# Patient Record
Sex: FEMALE | Race: WHITE | NOT HISPANIC OR LATINO | URBAN - METROPOLITAN AREA
[De-identification: names, ages, dates, MRNs, and addresses within clinical notes are randomized per-mention and may not be internally consistent; named-entity substitution may affect disease eponyms.]

---

## 2024-07-30 ASSESSMENT — ENCOUNTER SYMPTOMS
SHORTNESS OF BREATH: 0
ACTIVITY CHANGE: 0
ABDOMINAL PAIN: 0
UNEXPECTED WEIGHT CHANGE: 0
DIFFICULTY URINATING: 0
DYSURIA: 0
CHEST TIGHTNESS: 0
JOINT SWELLING: 0
ADENOPATHY: 0
DIZZINESS: 0
HEADACHES: 0
COLOR CHANGE: 0
FATIGUE: 0
ABDOMINAL DISTENTION: 0
WEAKNESS: 0

## 2024-07-31 ENCOUNTER — LAB (OUTPATIENT)
Dept: LAB | Facility: LAB | Age: 54
End: 2024-07-31
Payer: COMMERCIAL

## 2024-07-31 ENCOUNTER — OFFICE VISIT (OUTPATIENT)
Dept: OBSTETRICS AND GYNECOLOGY | Facility: CLINIC | Age: 54
End: 2024-07-31
Payer: COMMERCIAL

## 2024-07-31 VITALS
WEIGHT: 238.2 LBS | SYSTOLIC BLOOD PRESSURE: 128 MMHG | HEIGHT: 68 IN | BODY MASS INDEX: 36.1 KG/M2 | DIASTOLIC BLOOD PRESSURE: 74 MMHG

## 2024-07-31 DIAGNOSIS — Z12.11 SCREEN FOR COLON CANCER: ICD-10-CM

## 2024-07-31 DIAGNOSIS — Z01.419 VISIT FOR GYNECOLOGIC EXAMINATION: Primary | ICD-10-CM

## 2024-07-31 DIAGNOSIS — N95.1 PERIMENOPAUSE: ICD-10-CM

## 2024-07-31 DIAGNOSIS — Z30.431 INTRAUTERINE DEVICE SURVEILLANCE: ICD-10-CM

## 2024-07-31 DIAGNOSIS — Z11.51 SCREENING FOR HUMAN PAPILLOMAVIRUS: ICD-10-CM

## 2024-07-31 PROCEDURE — 1036F TOBACCO NON-USER: CPT | Performed by: OBSTETRICS & GYNECOLOGY

## 2024-07-31 PROCEDURE — 3008F BODY MASS INDEX DOCD: CPT | Performed by: OBSTETRICS & GYNECOLOGY

## 2024-07-31 PROCEDURE — 99396 PREV VISIT EST AGE 40-64: CPT | Performed by: OBSTETRICS & GYNECOLOGY

## 2024-07-31 PROCEDURE — 83001 ASSAY OF GONADOTROPIN (FSH): CPT

## 2024-07-31 PROCEDURE — 36415 COLL VENOUS BLD VENIPUNCTURE: CPT

## 2024-07-31 PROCEDURE — 87624 HPV HI-RISK TYP POOLED RSLT: CPT | Performed by: OBSTETRICS & GYNECOLOGY

## 2024-07-31 PROCEDURE — 88175 CYTOPATH C/V AUTO FLUID REDO: CPT | Mod: TC,GCY,PORLAB,WESLAB | Performed by: OBSTETRICS & GYNECOLOGY

## 2024-07-31 RX ORDER — LISINOPRIL AND HYDROCHLOROTHIAZIDE 20; 25 MG/1; MG/1
1 TABLET ORAL
COMMUNITY
Start: 2023-12-26

## 2024-07-31 RX ORDER — VENLAFAXINE HYDROCHLORIDE 75 MG/1
75 CAPSULE, EXTENDED RELEASE ORAL
COMMUNITY
Start: 2023-11-02 | End: 2024-11-01

## 2024-07-31 RX ORDER — ESOMEPRAZOLE MAGNESIUM 40 MG/1
1 CAPSULE, DELAYED RELEASE ORAL EVERY 24 HOURS
COMMUNITY

## 2024-07-31 RX ORDER — POTASSIUM CHLORIDE 750 MG/1
10 CAPSULE, EXTENDED RELEASE ORAL 2 TIMES DAILY
COMMUNITY

## 2024-07-31 RX ORDER — LIDOCAINE HYDROCHLORIDE AND HYDROCORTISONE ACETATE 30; 5 MG/G; MG/G
CREAM TOPICAL
COMMUNITY
Start: 2024-05-06

## 2024-07-31 ASSESSMENT — PATIENT HEALTH QUESTIONNAIRE - PHQ9
SUM OF ALL RESPONSES TO PHQ9 QUESTIONS 1 & 2: 0
1. LITTLE INTEREST OR PLEASURE IN DOING THINGS: NOT AT ALL
2. FEELING DOWN, DEPRESSED OR HOPELESS: NOT AT ALL

## 2024-07-31 ASSESSMENT — LIFESTYLE VARIABLES
HOW OFTEN DO YOU HAVE A DRINK CONTAINING ALCOHOL: NEVER
AUDIT-C TOTAL SCORE: 0
HOW OFTEN DO YOU HAVE SIX OR MORE DRINKS ON ONE OCCASION: NEVER
SKIP TO QUESTIONS 9-10: 1
HOW MANY STANDARD DRINKS CONTAINING ALCOHOL DO YOU HAVE ON A TYPICAL DAY: PATIENT DOES NOT DRINK

## 2024-07-31 ASSESSMENT — ENCOUNTER SYMPTOMS
LOSS OF SENSATION IN FEET: 0
OCCASIONAL FEELINGS OF UNSTEADINESS: 0
DEPRESSION: 0

## 2024-07-31 ASSESSMENT — PAIN SCALES - GENERAL: PAINLEVEL: 0-NO PAIN

## 2024-07-31 NOTE — PROGRESS NOTES
"Annual-menopause  Subjective   Laura Skaggs is a 53 y.o. ,last gyn exam 7/13/2022,  who is here for a routine exam/iud surveillance.   Complaints:  denies vag bleed or discharge; denies pelvic  pain, pressure, or persistent bloating.  Current Mirena placed 04/2019-- using tx mmr/bcm  PMHx: prev  BMI 41, now 36 ( low fat diet to help fatty liver dz).       Arthrits hips/right knee.       Hypertension.  Acid reflux. Depression/anxiety.  Surg Hx: 2024 gb and bladder cancer resection  Father: alive,  Htn; Mother: alive,  Mental Illness  Occupation: teacher.  Last pap 06/08/2020-both pap/hpv neg; Mamm 05/30/2024 [facility] : NEG.   Pelvic US: 3/2019 9cm uterus w 2cm serosal fibroid/fundus; thin endo; No intracavitary lesions.   BCM= mirena replaced 04/15/2019.   Periods : now absent on Mirena; Menarche 11; Last Period 06/22/2021.   STDs None.  not currently sexually active, updated 2024  Total preg  2.  Miscarriage(s)  1. NVD  1.   Review of Systems   Constitutional:  Negative for activity change, fatigue and unexpected weight change.   Respiratory:  Negative for chest tightness and shortness of breath.    Cardiovascular:  Negative for chest pain and leg swelling.   Gastrointestinal:  Negative for abdominal distention and abdominal pain.   Genitourinary:  Negative for difficulty urinating, dysuria, genital sores, pelvic pain, vaginal bleeding, vaginal discharge and vaginal pain.   Musculoskeletal:  Negative for gait problem and joint swelling.   Skin:  Negative for color change and rash.   Neurological:  Negative for dizziness, weakness and headaches.   Hematological:  Negative for adenopathy.   Objective Visit Vitals  /74   Ht 1.727 m (5' 8\")   Wt 108 kg (238 lb 3.2 oz)   BMI 36.22 kg/m²   OB Status Postmenopausal   Smoking Status Never   BSA 2.28 m²       General:   Alert and oriented, in no acute distress   Neck: Supple. No visible thyromegaly. adiposity   Breast/Axilla: Normal to palpation bilaterally without masses, " skin changes, or nipple discharge.    Abdomen: Soft, non-tender, without masses or organomegaly; central adipose distribution   Vulva: Normal architecture without erythema, masses, or lesions. Scattered skin tags bilat groin/stable   Vagina: Normal mucosa without lesions, masses.  No abnormal vaginal discharge.    Cervix: Normal without masses, lesions, or signs of cervicitis. Iud strings visible   Uterus:  Grossly normal mobile, non-enlarged uterus; exam limited by bmi    Adnexa: No palpable masses or tenderness; exam limited by bmi   Pelvic Floor No POP noted. No high tone pelvic floor    Psych  Rectal Normal affect. Normal mood.      Assessment/Plan   Encounter Diagnoses   Name Primary?    Visit for gynecologic examination; grossly benign breast and gyn exams; pelvic exam limited by bmi. Yes    Screening for human papillomavirus; pap sent.     Perimenopause; amenorrhea on Mirena; needs fsh chk to determine.     Intrauterine device surveillance; proper position per speculum.     Screen for colon cancer;utd and neg per pt/completes at F.     BMI 36.0-36.9,adult; long standing high bmi increases risk for endometrial hyperplasia and cancer after menopause; advised  continued tx for full 8 yrs if tolerates.     Zaynab Rivera MD

## 2024-08-01 LAB — FSH SERPL-ACNC: 42.9 IU/L

## 2024-08-19 LAB
CYTOLOGY CMNT CVX/VAG CYTO-IMP: NORMAL
HPV HR 12 DNA GENITAL QL NAA+PROBE: NEGATIVE
HPV HR GENOTYPES PNL CVX NAA+PROBE: NEGATIVE
HPV16 DNA SPEC QL NAA+PROBE: NEGATIVE
HPV18 DNA SPEC QL NAA+PROBE: NEGATIVE
LAB AP CONTRACEPTIVE HISTORY: NORMAL
LAB AP HPV GENOTYPE QUESTION: YES
LAB AP HPV HR: NORMAL
LABORATORY COMMENT REPORT: NORMAL
MENSTRUAL HX REPORTED: NORMAL
PATH REPORT.TOTAL CANCER: NORMAL

## 2025-08-01 ASSESSMENT — ENCOUNTER SYMPTOMS
DYSURIA: 0
COLOR CHANGE: 0
HEADACHES: 0
SHORTNESS OF BREATH: 0
DIZZINESS: 0
JOINT SWELLING: 0
DIFFICULTY URINATING: 0
ACTIVITY CHANGE: 0
FATIGUE: 0
CHEST TIGHTNESS: 0
ABDOMINAL PAIN: 0
UNEXPECTED WEIGHT CHANGE: 0
ABDOMINAL DISTENTION: 0
WEAKNESS: 0
ADENOPATHY: 0

## 2025-08-01 NOTE — PROGRESS NOTES
"Annual-menopause  Subjective   Laura Skaggs is a 54 y.o. [id number]  last seen 24, who is here for a routine exam.   Complaints:  iud surveillance denies any pelvic pain or bleeding; denies vag bleed or discharge; denies pelvic  pain, pressure, or persistent bloating.  Recently moved to [facility] home [place].  Anticipating correction 350 days.  FSH 24=42.9  Current Mirena placed 2019-- using tx mmr/bcm  PMHx: History of bladder cancer/gets scoped yearly;   prev  BMI 41, now 37 ( low fat diet for fatty liver dz).   Arthrits hips/right knee.  Htn.  Acid reflux.  Depression/anxiety.  Surg Hx:  gb and bladder cancer(urol Dr. Church at [place]) resection  Father: alive,  Htn; Mother: alive,  Mental Illness  Occup: teacher.  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   Pelvic US: 3/2019 9cm uterus w 2cm serosal fibroid/fundus; thin endo; No intracavitary lesions.   C scope [facility] neg  BCM= mirena replaced 04/15/2019.   Periods : now absent on Mirena; Menarche 11; LMP 2021.   STDs None. not currently sexually active, updated   Total preg 2.  Miscarriage(s)  1. NVD  1.   Review of Systems   Constitutional:  Negative for activity change, fatigue and unexpected weight change.   Respiratory:  Negative for chest tightness and shortness of breath.    Cardiovascular:  Negative for chest pain and leg swelling.   Gastrointestinal:  Negative for abdominal distention and abdominal pain.   Genitourinary:  Negative for difficulty urinating, dysuria, genital sores, pelvic pain, vaginal bleeding, vaginal discharge and vaginal pain.   Musculoskeletal:  Negative for gait problem and joint swelling.   Skin:  Negative for color change and rash.   Neurological:  Negative for dizziness, weakness and headaches.   Hematological:  Negative for adenopathy.   Objective Visit Vitals  /80   Ht 1.727 m (5' 8\")   Wt 112 kg (247 lb 6.4 oz)   BMI 37.62 kg/m²   OB Status Postmenopausal   Smoking Status Never   BSA " 2.32 m²       General:   Alert and oriented, in no acute distress   Neck: Supple. No visible thyromegaly.    Breast/Axilla: Normal to palpation bilaterally without masses, skin changes, or nipple discharge.    Abdomen: Soft, non-tender, without masses or organomegaly; central adiposity   Vulva: Normal architecture without erythema, masses, or lesions.    Vagina: Normal mucosa without lesions, masses.  . No abnormal vaginal discharge.    Cervix: Normal without masses, lesions, or signs of cervicitis.  IUD strings palpable   Uterus: Grossly normal mobile, non-enlarged uterus ; exam limited by BMI   Adnexa: No palpable masses or tenderness; exam limited by BMI   Pelvic Floor No POP noted. No high tone pelvic floor    Psych   Normal affect. Normal mood.      Assessment/Plan   Encounter Diagnoses   Name Primary?    Visit for gynecologic examination; no suspicious findings on current breast or pelvic exam. Yes    Encounter for screening mammogram for malignant neoplasm of breast; completed and normal for      Perimenopause; FSH confirmed elevated ; no significant vasomotor or sleep symptoms as yet.     Intrauterine device surveillance; proper position per speculum; given history of menometrorrhagia and risk for endometrial hyperplasia, will keep in place till  .     Screen for colon cancer; up-to-date and normal     BMI 37.0-37.9, adult; explained adiposity can limit ability to palpate GYN structures; patient denies any symptoms warranting ultrasound evaluation at this time.     Zaynab Rivera MD

## 2025-08-06 ENCOUNTER — OFFICE VISIT (OUTPATIENT)
Dept: OBSTETRICS AND GYNECOLOGY | Facility: CLINIC | Age: 55
End: 2025-08-06
Payer: COMMERCIAL

## 2025-08-06 VITALS
DIASTOLIC BLOOD PRESSURE: 80 MMHG | SYSTOLIC BLOOD PRESSURE: 124 MMHG | WEIGHT: 247.4 LBS | HEIGHT: 68 IN | BODY MASS INDEX: 37.5 KG/M2

## 2025-08-06 DIAGNOSIS — Z01.419 VISIT FOR GYNECOLOGIC EXAMINATION: Primary | ICD-10-CM

## 2025-08-06 DIAGNOSIS — Z12.31 ENCOUNTER FOR SCREENING MAMMOGRAM FOR MALIGNANT NEOPLASM OF BREAST: ICD-10-CM

## 2025-08-06 DIAGNOSIS — Z30.431 INTRAUTERINE DEVICE SURVEILLANCE: ICD-10-CM

## 2025-08-06 DIAGNOSIS — Z12.11 SCREEN FOR COLON CANCER: ICD-10-CM

## 2025-08-06 DIAGNOSIS — N95.1 PERIMENOPAUSE: ICD-10-CM

## 2025-08-06 PROCEDURE — 3008F BODY MASS INDEX DOCD: CPT | Performed by: OBSTETRICS & GYNECOLOGY

## 2025-08-06 PROCEDURE — 99203 OFFICE O/P NEW LOW 30 MIN: CPT | Performed by: OBSTETRICS & GYNECOLOGY

## 2025-08-06 PROCEDURE — 99396 PREV VISIT EST AGE 40-64: CPT | Performed by: OBSTETRICS & GYNECOLOGY

## 2025-08-06 PROCEDURE — 99213 OFFICE O/P EST LOW 20 MIN: CPT | Performed by: OBSTETRICS & GYNECOLOGY

## 2025-08-06 PROCEDURE — 1036F TOBACCO NON-USER: CPT | Performed by: OBSTETRICS & GYNECOLOGY

## 2025-08-06 RX ORDER — SUMATRIPTAN SUCCINATE 50 MG/1
50 TABLET ORAL
COMMUNITY
Start: 2025-08-04

## 2025-08-06 RX ORDER — IBUPROFEN 800 MG/1
800 TABLET, FILM COATED ORAL
COMMUNITY
Start: 2025-08-04 | End: 2025-09-13

## 2025-08-06 RX ORDER — ATORVASTATIN CALCIUM 10 MG/1
10 TABLET, FILM COATED ORAL
COMMUNITY
Start: 2025-01-21

## 2025-08-06 ASSESSMENT — ENCOUNTER SYMPTOMS
LOSS OF SENSATION IN FEET: 0
DEPRESSION: 0
OCCASIONAL FEELINGS OF UNSTEADINESS: 0

## 2025-08-06 ASSESSMENT — PATIENT HEALTH QUESTIONNAIRE - PHQ9
2. FEELING DOWN, DEPRESSED OR HOPELESS: NOT AT ALL
1. LITTLE INTEREST OR PLEASURE IN DOING THINGS: NOT AT ALL
SUM OF ALL RESPONSES TO PHQ9 QUESTIONS 1 & 2: 0

## 2025-08-06 ASSESSMENT — LIFESTYLE VARIABLES
HOW MANY STANDARD DRINKS CONTAINING ALCOHOL DO YOU HAVE ON A TYPICAL DAY: PATIENT DOES NOT DRINK
HOW OFTEN DO YOU HAVE A DRINK CONTAINING ALCOHOL: NEVER
HOW OFTEN DO YOU HAVE SIX OR MORE DRINKS ON ONE OCCASION: NEVER
AUDIT-C TOTAL SCORE: 0
SKIP TO QUESTIONS 9-10: 1

## 2025-08-06 ASSESSMENT — PAIN SCALES - GENERAL: PAINLEVEL_OUTOF10: 0-NO PAIN
